# Patient Record
(demographics unavailable — no encounter records)

---

## 2024-12-03 NOTE — PLAN
[FreeTextEntry1] : -Continue Concerta to 45 m mg- Take at 7 am to minimize sleep side effects.  -Add 5 mg  methylphenidate in the afternoon for ADHD. -START sertraline as discussed:  Take 12.5 mg (1/2 tab) X 1 week, then increase to 25 mg X 1 week, then take 50 mg daily (2 tablets) -Counseling on ADHD medication; risks, benefits, possible side effects. - Discussed common side effects of SSRIs including GI upset, headache, sweating, appetite changes, sleep changes (nightmares, impaired sleep), behavioral activation, emergence suicidality, decreased libido, evolving psychopathology. Advised to contact provider immediately if child develops any rare but serious side effects including unusual changes in behavior or restlessness, or confusion.  Medication should not be stopped abruptly in most situations as can cause discontinuation syndrome (fever, hyperthermia, restlessness, confusion, et). -Recommended restart therapist for anxiety.  -Follow up in 1 month.

## 2024-12-03 NOTE — PHYSICAL EXAM
[Well-appearing] : well-appearing [Alert] : alert [Well related, good eye contact] : well related, good eye contact [Conversant] : conversant [Normal speech and language] : normal speech and language [Follows instructions well] : follows instructions well [Gross hearing intact] : gross hearing intact [de-identified] : Interactive and appropriate mood, friendly.

## 2024-12-03 NOTE — PHYSICAL EXAM
[Well-appearing] : well-appearing [Alert] : alert [Well related, good eye contact] : well related, good eye contact [Conversant] : conversant [Normal speech and language] : normal speech and language [Follows instructions well] : follows instructions well [Gross hearing intact] : gross hearing intact [de-identified] : Interactive and appropriate mood, friendly.

## 2024-12-03 NOTE — HISTORY OF PRESENT ILLNESS
[FreeTextEntry1] : Valeria is a 17 y/o female presents for an ADHD follow up s/p seen 24. Concerta 36 mg had been less effective than when initially started and seems to be causing delayed sleep.  As patient's main concern is to many her hyperactive symptoms, added guanfacine to help with ADHD and sleep.  Also increasde Concerta to 45 mg as patient concerned that non-stimulant may take weeks to be effective.  Strongly recommended resuming psychotherapy for anxiety disorder  She was diagnosed with ADHD, combined presentation on 24, and started on Concerta at that time. She has a complex medical history including ALL diagnosed in , s/p completed treatment 10/27/22, avascular necrosis of the right humeral head s/p surgery, iron overload, receiving monthly phlebotomy.  PLAN FROM PREVIOUS VISIT -Increase Concerta to 45 m mg- Take at 7 am to minimize sleep side effects.  -Start guanfacine ER  Week #1 Start guanfacine ER X 1 week at bedtime- STOPPED DUE TO ALLERGIC REACTION  Week #2 increase to 2 mg in the evening  Week #3 increase to3  mg in the evening  Week #3 increase to 4 mg  in the evening.  -Counseling on ADHD medication; risks, benefits, possible side effects. -Recommended restart therapist for anxiety.  -Follow up in 1 month.  INTERIM HPI GUANFACINE ALLERGY? 24- mother called; patient had symptoms of anaphylaxis after taking guanfacine.      Took methylphenidate at 2 pm.  Took guanfacine in the evening at 10 pm.  She felt her throat itchy an hour or 2 later.  Between 6:15 pm This morning her face was swollen, she c/o it was hard to breathe, mother call 911, was told her throat and tongue were swollen.  Was taken to [facility], was given EpiPen, and has since improved.   Took her Concerta 45 mg this morning in the ED, as was fine.  She has a past allergic reaction to Zofran.   Today, patient reports Concerta 45 mg works well but wears off around 1:30 or 2.  She takes it at 7 am.  She sometimes feels a bit jittery if she doesn't eat with it.  Her sleep is still a problem, although she doesn't feel it's related to the medication.   HPI FROM VISIT ON 24  Valeria is a 17 y/o female is here for an ADHD follow up s/p seen 24 and was doing well on Concerta 36 mg. She was diagnosed with ADHD, combined presentation on 24, and started on Concerta at that time. She has a complex medical history including ALL diagnosed in , s/p completed treatment 10/27/22, avascular necrosis of the right humeral head s/p surgery, iron overload, receiving monthly phlebotomy.   Her anxiety has slightly improved as well.   PLAN FROM PREVIOUS VISIT -Continue Concerta 36 mg- Take after breakfast due to nausea side effects.  -Counseling on ADHD medication; risks, benefits, possible side effects. -Sleep hygiene discussed.  -Follow up in 1 month.  INTERIM HPI Patient endorses it's not as effective as it used to be.  She is feeling morley "antsy" and asking if she can try a higher dose.  Wears off around 4:30- 5 pm.  Denies changes in appetite, headaches or stomachaches. Having problems with falling sleep since starting Concerta.  Since the summer, takes it at 8-9 am, and can't fall asleep until 1 am.  If she takes it at 10 am, is up until 4 am.  She often wakes up around by 9:30 am even when up late.  Does not feel tired during the day and has energy, but is aware she needs more sleep  Seems to last until around 4-5 pm.  Feels more "ansty throughout the day."  Has been feeling a bit more anxious, in certain situations, in public doesn't feel safe.  Started about a month ago, was triggered after walking her dog, though she was being followed.  Has not been seeing a therapist.    Mother was out of camera view, expressed concern about her sleep, agreeable to medication adjustment as needed.     HPI FROM VISIT ON 24  Valeria is a 17 y/o female is here for an ADHD follow up s/p diagnosed with ADHD, combined presentation on 24, and at last follow up i34 increased Concerta to 27 mg.  She has a complex medical history including ALL diagnosed in , s/p completed treatment 10/27/22, avascular necrosis of the right humeral head s/p surgery, iron overload, receiving monthly phlebotomy.  A recent neuropsychological evaluation was consistent with a diagnosis of ADHD, combined presentation, and generalized anxiety disorder.   Concerta 18 mg had some effectiveness, noticed by patient and her mother.  Her anxiety has slightly improved as well.  She has mild GI side effects (nausea) noticed to be worse when taken without food.    PLAN FROM PREVIOUS VISIT -Increase Concerta to 27 mg- Take after breakfast due to nausea side effects.  -Counseling on ADHD medication; risks, benefits, possible side effects. -Sleep hygiene discussed.  -Follow up in 1 month.  INTERIM HPI Patient not feeling well today, in bed, able to speak but did not want to be on video. Was taking 27 mg Concerta, was not very effective, then increased to 36 mg (took 2 X 18 mg) starting over a week ago and that has been very effective.  She feels much more focused and does not feel she needs a higher dosage. Her anxiety has improved, Patient denies medication side effects.  She has been taking it on the weekends and would like to take it all summer as well.    HPI FROM VISIT ON 24  Valeria is a 17 y/o female here for her 1st ADHD follow up s/p diagnosed with ADHD, combined presentation 24, and starting on Concerta. She has a complex medical history including ALL diagnosed in , s/p completed treatment 10/27/22, avascular necrosis of the right humeral head s/p surgery, iron overload, receiving monthly phlebotomy.  A recent neuropsychological evaluation was consistent with a diagnosis of ADHD, combined presentation, and generalized anxiety disorder.   Encouraged patient to try CBT therapy.    INTERIM HPI Taking Concerta 18 mg-has some effect, can focus a little better, but wears off by 4th period. She feels her anxiety is not worse at all, maybe even better.  Mother sees a difference as well.  Her anxiety seems better, she seems calmer.  Denies side effects except sometimes has some nausea, especially on the days she does not eat breakfast or takes before she eats. Sleep was delayed on the weekends when woke up later so took Concerta later.  Otherwise no sleep problems during the week.   HPI FROM VISIT ON 24  Valeria is a 17 y/o female here concerns of inattention difficulties and anxiety.  She has a complex medical history including  ALL diagnosed in , s/p completed treatment 10/27/22, avascular necrosis of the right humeral head s/p surgery, iron overload, receiving monthly phlebotomy.   She had expressed over the past year an increase in attention and hyperactive behaviors, and a neuropsychological evaluation in 2023 by Dr Catherine Vail, PhD. Medical records provided by mother; and scanned report in EHR: patient was diagnosed with the followin. Acquired cognitive dysfunction 2 ADHD, combined presentation 3. Generalized anxiety disorder.  Recommendations by Dr Vail include psychotherapy for anxiety, psychiatric consultation, and school accommodations such as extra time on testing, reduced assignments, brief breaks, and preferential seating.    Patient and mother are here today as they feel would like her to start ADHD medication. Patient lives with parents and 16 y/o sister and attends 11th grade at [employer].  Over the past year, Aggie expressed increasing difficulty concentrating in school and homework.  Her mother felt she was always very active, not able to sit still, however was not concerned as she does well academically.   As works is becoming harder, she is struggling more with focusing as she can't stop herself from moving.   Her anxiety has also worsened over the past year, and she gets particularly anxious about her phlebotomies every month.  Aggie was interviewed and throughout evaluation her legs were constantly swinging.  She feels her inattention difficulties are not due to her anxiety, and she is not interested in starting psychotherapy.  She expressed understanding that anxiety can cause inattention difficulties, however, feels it's her urge to constantly be in motion that she finds most impactful.  She wants to be able to sit still.  She cannot do many sports due to her arm surgery but enjoys running.  She has a 504 Plan since her medical treatments, and has extra time as needed on assignments. Valeria does her homework in school and feels the homework demands are low.      DEVELOPMENTAL HX  Child was born full term without complications and reached all age-appropriate developmental milestones without concerns. Early Intervention Services were not needed.   HOME BEHAVIORS (reported by mother)   -Attention: Mother reports patient is very easily distracted, needs frequent redirection, and has a lot of difficulty with multi-step instructions.   -Hyperactivity: Fidgety, not able to sit still.    -Impulsivity:  Is not aggressive, does interrupt when others are speaking, constantly blurts out.    -Organization: Has difficulty staying organized, and loses things all the time.   -Homework: Gets her homework done in 10 minutes, mostly done in school.      CURRENT SCHOOL -School: [employer], 11th grade  -Public school  -Special Ed services- 504 plan  -  Classification: Medical? - General education classroom -Accommodations- extra time -Academic performance/grades: 90's     RELATIONSHIPS: Gets along well with peers, parents.   EMOTIONAL Anxiety Denies depression   SLEEP Difficulty falling asleep- goes to bed at 12, wakes by 7:30.     ACTIVITIES/INTERESTS Has restrictions due to arm surgery Runs outdoors      MEDICAL HISTORY: Denies a history of tics Denies history of starting spells, twitching, seizure-like activity.   FAMILY HISTORY: Family history of ADHD:  grandfather Family history of anxiety or depression: Maternal side, depression, on meds Denies family history of cardiac conditions or early unexplained deaths (has had cardiology evaluation prior to chemo)

## 2024-12-03 NOTE — ASSESSMENT
[FreeTextEntry1] : Valeria is a 15 y/o female presents for an ADHD follow up s/p seen 07/16/24. She was started on guanfacine however causes symptoms of anaphylaxis.  Concerta was increased to 45 mg and this was well tolerated. Strongly recommended resuming psychotherapy for anxiety disorder  She was diagnosed with ADHD, combined presentation on 02/20/24, and started on Concerta at that time. She has a complex medical history including ALL diagnosed in 2020, s/p completed treatment 10/27/22, avascular necrosis of the right humeral head s/p surgery, iron overload, receiving monthly phlebotomy. Concerta 45 mg has been effective but wearing off early.   Will add PM dose of IR  methylphenidate 5 mg.  Will monitor sleep due to history of sleep difficulties.  Anxiety has worsened.   Patient/mother agreeable to starting SSRI.  Will start sertraline.

## 2024-12-03 NOTE — HISTORY OF PRESENT ILLNESS
[FreeTextEntry1] : Valeria is a 17 y/o female presents for an ADHD follow up s/p seen 24. Concerta 36 mg had been less effective than when initially started and seems to be causing delayed sleep.  As patient's main concern is to many her hyperactive symptoms, added guanfacine to help with ADHD and sleep.  Also increasde Concerta to 45 mg as patient concerned that non-stimulant may take weeks to be effective.  Strongly recommended resuming psychotherapy for anxiety disorder  She was diagnosed with ADHD, combined presentation on 24, and started on Concerta at that time. She has a complex medical history including ALL diagnosed in , s/p completed treatment 10/27/22, avascular necrosis of the right humeral head s/p surgery, iron overload, receiving monthly phlebotomy.  PLAN FROM PREVIOUS VISIT -Increase Concerta to 45 m mg- Take at 7 am to minimize sleep side effects.  -Start guanfacine ER  Week #1 Start guanfacine ER X 1 week at bedtime- STOPPED DUE TO ALLERGIC REACTION  Week #2 increase to 2 mg in the evening  Week #3 increase to3  mg in the evening  Week #3 increase to 4 mg  in the evening.  -Counseling on ADHD medication; risks, benefits, possible side effects. -Recommended restart therapist for anxiety.  -Follow up in 1 month.  INTERIM HPI GUANFACINE ALLERGY? 24- mother called; patient had symptoms of anaphylaxis after taking guanfacine.      Took methylphenidate at 2 pm.  Took guanfacine in the evening at 10 pm.  She felt her throat itchy an hour or 2 later.  Between 6:15 pm This morning her face was swollen, she c/o it was hard to breathe, mother call 911, was told her throat and tongue were swollen.  Was taken to [facility], was given EpiPen, and has since improved.   Took her Concerta 45 mg this morning in the ED, as was fine.  She has a past allergic reaction to Zofran.   Today, patient reports Concerta 45 mg works well but wears off around 1:30 or 2.  She takes it at 7 am.  She sometimes feels a bit jittery if she doesn't eat with it.  Her sleep is still a problem, although she doesn't feel it's related to the medication.   HPI FROM VISIT ON 24  Valeria is a 17 y/o female is here for an ADHD follow up s/p seen 24 and was doing well on Concerta 36 mg. She was diagnosed with ADHD, combined presentation on 24, and started on Concerta at that time. She has a complex medical history including ALL diagnosed in , s/p completed treatment 10/27/22, avascular necrosis of the right humeral head s/p surgery, iron overload, receiving monthly phlebotomy.   Her anxiety has slightly improved as well.   PLAN FROM PREVIOUS VISIT -Continue Concerta 36 mg- Take after breakfast due to nausea side effects.  -Counseling on ADHD medication; risks, benefits, possible side effects. -Sleep hygiene discussed.  -Follow up in 1 month.  INTERIM HPI Patient endorses it's not as effective as it used to be.  She is feeling morley "antsy" and asking if she can try a higher dose.  Wears off around 4:30- 5 pm.  Denies changes in appetite, headaches or stomachaches. Having problems with falling sleep since starting Concerta.  Since the summer, takes it at 8-9 am, and can't fall asleep until 1 am.  If she takes it at 10 am, is up until 4 am.  She often wakes up around by 9:30 am even when up late.  Does not feel tired during the day and has energy, but is aware she needs more sleep  Seems to last until around 4-5 pm.  Feels more "ansty throughout the day."  Has been feeling a bit more anxious, in certain situations, in public doesn't feel safe.  Started about a month ago, was triggered after walking her dog, though she was being followed.  Has not been seeing a therapist.    Mother was out of camera view, expressed concern about her sleep, agreeable to medication adjustment as needed.     HPI FROM VISIT ON 24  Valeria is a 17 y/o female is here for an ADHD follow up s/p diagnosed with ADHD, combined presentation on 24, and at last follow up i34 increased Concerta to 27 mg.  She has a complex medical history including ALL diagnosed in , s/p completed treatment 10/27/22, avascular necrosis of the right humeral head s/p surgery, iron overload, receiving monthly phlebotomy.  A recent neuropsychological evaluation was consistent with a diagnosis of ADHD, combined presentation, and generalized anxiety disorder.   Concerta 18 mg had some effectiveness, noticed by patient and her mother.  Her anxiety has slightly improved as well.  She has mild GI side effects (nausea) noticed to be worse when taken without food.    PLAN FROM PREVIOUS VISIT -Increase Concerta to 27 mg- Take after breakfast due to nausea side effects.  -Counseling on ADHD medication; risks, benefits, possible side effects. -Sleep hygiene discussed.  -Follow up in 1 month.  INTERIM HPI Patient not feeling well today, in bed, able to speak but did not want to be on video. Was taking 27 mg Concerta, was not very effective, then increased to 36 mg (took 2 X 18 mg) starting over a week ago and that has been very effective.  She feels much more focused and does not feel she needs a higher dosage. Her anxiety has improved, Patient denies medication side effects.  She has been taking it on the weekends and would like to take it all summer as well.    HPI FROM VISIT ON 24  Valeria is a 17 y/o female here for her 1st ADHD follow up s/p diagnosed with ADHD, combined presentation 24, and starting on Concerta. She has a complex medical history including ALL diagnosed in , s/p completed treatment 10/27/22, avascular necrosis of the right humeral head s/p surgery, iron overload, receiving monthly phlebotomy.  A recent neuropsychological evaluation was consistent with a diagnosis of ADHD, combined presentation, and generalized anxiety disorder.   Encouraged patient to try CBT therapy.    INTERIM HPI Taking Concerta 18 mg-has some effect, can focus a little better, but wears off by 4th period. She feels her anxiety is not worse at all, maybe even better.  Mother sees a difference as well.  Her anxiety seems better, she seems calmer.  Denies side effects except sometimes has some nausea, especially on the days she does not eat breakfast or takes before she eats. Sleep was delayed on the weekends when woke up later so took Concerta later.  Otherwise no sleep problems during the week.   HPI FROM VISIT ON 24  Valeria is a 17 y/o female here concerns of inattention difficulties and anxiety.  She has a complex medical history including  ALL diagnosed in , s/p completed treatment 10/27/22, avascular necrosis of the right humeral head s/p surgery, iron overload, receiving monthly phlebotomy.   She had expressed over the past year an increase in attention and hyperactive behaviors, and a neuropsychological evaluation in 2023 by Dr Catherine Vail, PhD. Medical records provided by mother; and scanned report in EHR: patient was diagnosed with the followin. Acquired cognitive dysfunction 2 ADHD, combined presentation 3. Generalized anxiety disorder.  Recommendations by Dr Vail include psychotherapy for anxiety, psychiatric consultation, and school accommodations such as extra time on testing, reduced assignments, brief breaks, and preferential seating.    Patient and mother are here today as they feel would like her to start ADHD medication. Patient lives with parents and 16 y/o sister and attends 11th grade at [employer].  Over the past year, Aggie expressed increasing difficulty concentrating in school and homework.  Her mother felt she was always very active, not able to sit still, however was not concerned as she does well academically.   As works is becoming harder, she is struggling more with focusing as she can't stop herself from moving.   Her anxiety has also worsened over the past year, and she gets particularly anxious about her phlebotomies every month.  Aggie was interviewed and throughout evaluation her legs were constantly swinging.  She feels her inattention difficulties are not due to her anxiety, and she is not interested in starting psychotherapy.  She expressed understanding that anxiety can cause inattention difficulties, however, feels it's her urge to constantly be in motion that she finds most impactful.  She wants to be able to sit still.  She cannot do many sports due to her arm surgery but enjoys running.  She has a 504 Plan since her medical treatments, and has extra time as needed on assignments. Valeria does her homework in school and feels the homework demands are low.      DEVELOPMENTAL HX  Child was born full term without complications and reached all age-appropriate developmental milestones without concerns. Early Intervention Services were not needed.   HOME BEHAVIORS (reported by mother)   -Attention: Mother reports patient is very easily distracted, needs frequent redirection, and has a lot of difficulty with multi-step instructions.   -Hyperactivity: Fidgety, not able to sit still.    -Impulsivity:  Is not aggressive, does interrupt when others are speaking, constantly blurts out.    -Organization: Has difficulty staying organized, and loses things all the time.   -Homework: Gets her homework done in 10 minutes, mostly done in school.      CURRENT SCHOOL -School: [employer], 11th grade  -Public school  -Special Ed services- 504 plan  -  Classification: Medical? - General education classroom -Accommodations- extra time -Academic performance/grades: 90's     RELATIONSHIPS: Gets along well with peers, parents.   EMOTIONAL Anxiety Denies depression   SLEEP Difficulty falling asleep- goes to bed at 12, wakes by 7:30.     ACTIVITIES/INTERESTS Has restrictions due to arm surgery Runs outdoors      MEDICAL HISTORY: Denies a history of tics Denies history of starting spells, twitching, seizure-like activity.   FAMILY HISTORY: Family history of ADHD:  grandfather Family history of anxiety or depression: Maternal side, depression, on meds Denies family history of cardiac conditions or early unexplained deaths (has had cardiology evaluation prior to chemo)

## 2025-01-21 NOTE — REVIEW OF SYSTEMS
[Anxiety] : anxiety [Negative] : Allergic/Immunologic [Fever] : no fever [Chills] : no chills [Sweating] : no sweating [Fatigue] : no fatigue [Weakness] : no weakness [Weight Change] : no weight change [Icterus] : no icterus [Abdominal Pain] : no abdominal pain [Nausea] : no nausea [Constipation] : no constipation [Rectal Pain] : no rectal pain [Hematochezia] : no hematochezia [Joint Pain] : no joint pain [Joint Swelling] : no joint swelling [Joint Stiffness] : no joint stiffness [Myalgia] : no myalgia [Back Pain] : no back pain [Bone Pain] : no bone pain [Resendez] : not resendez [Irritable] : not irritable [FreeTextEntry2] : had some attention difficulties at school last year, now on medication for ADHD  [FreeTextEntry1] : see HPI [de-identified] : right shoulder pain,  elbow AVN pain bilaterally right hurting more than left,   had  left elbow surgery but does not feel it helped her ability to move the joint [de-identified] : hx DM steroid induced

## 2025-01-21 NOTE — REASON FOR VISIT
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

## 2025-01-21 NOTE — CONSULT LETTER
[Dear  ___] : Dear  [unfilled], [Courtesy Letter:] : I had the pleasure of seeing your patient, [unfilled], in my office today. [Please see my note below.] : Please see my note below. [Consult Closing:] : Thank you very much for allowing me to participate in the care of this patient.  If you have any questions, please do not hesitate to contact me. [FreeTextEntry2] : Dr. Remedios Lundberg [address] [place] [address] phone [phone] [FreeTextEntry3] : KHANH Nielsen Pediatric Nurse Practitioner [facility] Pediatric Hematology/Oncology and Stem Cell Transplantation [id number] [facility], [address] [place] [address] Phone [phone] fax: [phone]

## 2025-01-21 NOTE — PHYSICAL EXAM
[Normal] : PERRL, extraocular movements intact, cranial nerves II-XII grossly intact [Gait abnormal] : gait abnormal [PERRLA] : GHAZAL [90: Minor restrictions in physically strenuous activity.] : 90: Minor restrictions in physically strenuous activity. [Pallor] : no pallor [Icterus] : not icterus [Mucositis] : no mucositis [Braces] : no braces  [de-identified] : capillary refill < 2 seconds [FreeTextEntry1] : defer [de-identified] : defer [de-identified] : both elbows with some pain with movement, cannot fully extend both arms , no swelling, FROM  [de-identified] : equal strength and tone to bilateral lower extremeties, sensation intact, normal baseline gait [de-identified] : interactive

## 2025-01-21 NOTE — HISTORY OF PRESENT ILLNESS
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  6/20-7/10/20: 13 yr old girl admitted to [facility] for 1-2 week history of flank pain, loss of appetite, fever and lymphadenopathy of the neck. seen by PMD who did cbc and notified parents to come to ER for abnormal labs. CBC in ER showed WBC=34.99, ANC 1,850 with 59% blast, HBG=11.3 and PLT 15,000.LDH 1396. Peripheral flow sent showed Lymphoblasts (82% of cells), positive for cytoplasmic CD3, TdT, CD38, CD34, CD7, CD2,minimal CD4, CD5, minimal , partial CD71, consistent with T-lymphoblastic leukemia/lymphoma. Bone marrow done on 6/23/20: FISH Result: NEGATIVE FOR BCR/ABL1 REARRANGEMENT, POSITIVE FOR KMT2A (MLL) REARRANGEMENT IN 82.0% Of CELLS Karyotype:46,XX,del(7)(p?13),t(11;19)(q23;p13.3)        {6              }/46,XX,del(9)(p22),t(11;19)(q23;p13.3)          {3             }/46,XX             {11            }. LP showed CNS 2b. Echo done 6/22/20 with SF 32%. IR placed mediport on 6/23/20. Rasburicase given x 4 doses total and started on allopurinol. patient was started following protocol [id number] induction. CNS clear after 3 consecutive LP with either IT MTX or IT cytarabine. complications during induction included an allergic reaction to ondansetron. She also developed steroid induced hyperglycemia on 6/27/20 and was started on lantus and formulated meal correction with calculated humalog doses based on correction factors and insulin to carb ratio and target insulin. Discharged on 7/10/20.  8/6-8/8/20: Admitted for Fever and chills, concern for port site infection and/or ingrown toe nail infection, s/p ceftriaxone and vancomycin, cultures negative.   8/25-8/27: admitted for r/o sepsis and observation for chills and left lower quadrant abdominal pain. Cultures were negative. All symptoms improved. She remained afebrile.  2/19-3/5/21: admitted for febrile neutropenia sent to PICU for hypotension requiring pressors. Blood cultures were negative but patient was treated for culture negative sepsis. received neupogen. SEnt home on Levofloxacin 3/16/21: begin maintenance 3/30/21: oral mercaptopurine and MTX held for PLT count 26, 000, Direct bilirubin 3.3, total bili 4.8. 4/2/21: direct bilirubin level 6.5, total bilirubin 8.4, US abdomen done: Liver is upper limits of normal with respect to size. No masses are seen. Normal hepatic vasculature.. Gallbladder sludge.Started on po ursodiol. 6/6/21: T2 star MRI done to assess iron overload since patient was noted to have elevated ferritin level of 3183 on 4/20/21. The MRI showed hepatic iron overload, started on Desferal SQ treatment at home.  3/21-3/23/22: admission for non neutropenic fever, chills, blood cultures negative, RVP positive for coronavirus OC 43.  5/15/22: MRI right shoulder shows AVN of the right humeral head with surrounding bone marrow edema, referred to ortho.  10/27/22: end treatment  . 11/11/22: MRI of abdomen done for iron evaluation shows CARDIAC: There is no significant cardiac iron deposition.  LIVER: positive for Hepatic iron deposition   T-Cell ALL  MLL rearrangement End of Induction marrow: 1.5% positive End of consolidation marrow: negative.  10/27/22: end of treatment 12/27/23: begin monthly phlebotomy for iron overload 12/28/22: mediport removed 2/13/23: end of treatment echo done, SF 32%   [de-identified] : Valeria is a 17 yr old female with HR T-Cell ALL who completed treatment per protocol [id number] on 10/27/22. She is here today for bloodwork  and a check up for her 2 1/4 years off treatment follow up. She has been receiving monthly phlebotomy for her known iron overload, we were having difficulty getting venous access but for the last few months we have been successful by using the sonogram guided IV insertion equipment.   According to Valeria and her mother, she has been doing fairly well since her last visit. She was seen in the ER at the beginning of the month for persistent fevers and was found to have adenovirus. Her symptoms are now resolved. She is still having mobility issues with her elbows and continues her follow up with ortho. She continues to follow up with neuro for medical management of her ADHD. No other complaints or problems. She is in the 12th grade and doing well.     She is trying to do better taking her vitamin D pills, her recent level was still low.  She is also taking her ADHD medication as prescribed.

## 2025-01-23 NOTE — HISTORY OF PRESENT ILLNESS
[Home] : at home, [unfilled] , at the time of the visit. [Medical Office: ([facility])___] : at the medical office located in  [Mother] : mother [FreeTextEntry3] : mother [FreeTextEntry1] : Valeria is a 15 y/o female here for an ADHD follow up s/p seen 24 and starting on sertraline for anxiety..   She also takes Concerta 45 mg which was wearing off early thus added an afternoon dose of IR  methylphenidate.   Strongly recommended resuming psychotherapy for anxiety disorder. Previously has tried guanfacine however caused anaphylaxis. She was diagnosed with ADHD, combined presentation on 24, and started on Concerta at that time. She has a complex medical history including ALL diagnosed in , s/p completed treatment 10/27/22 (3. years ago), avascular necrosis of the right humeral head s/p surgery, iron overload, receiving monthly phlebotomy.  PLAN FROM PREVIOUS VISIT (24)  -Continue Concerta to 45 m mg- Take at 7 am to minimize sleep side effects.  -Add 5 mg  methylphenidate in the afternoon for ADHD. -START sertraline as discussed:  Take 12.5 mg (1/2 tab) X 1 week, then increase to 25 mg X 1 week, then take 50 mg daily (2 tablets) -Counseling on ADHD medication; risks, benefits, possible side effects. - Discussed common side effects of SSRIs including GI upset, headache, sweating, appetite changes, sleep changes (nightmares, impaired sleep), behavioral activation, emergence suicidality, decreased libido, evolving psychopathology. Advised to contact provider immediately if child develops any rare but serious side effects including unusual changes in behavior or restlessness, or confusion.  Medication should not be stopped abruptly in most situations as can cause discontinuation syndrome (fever, hyperthermia, restlessness, confusion, et). -Recommended restart therapist for anxiety.  -Follow up in 1 month.   INTERIM HPI Feeling her mood is a lot better.  No longer worrying all the time, anxiety is very low and manageable. Mother sees a big improvement as well.   Mother/patient both feel this dosage is very good.  Concerta 45 mg still very effective.  She has not tried PM dose as hasn't felt she needed it.   HPI FROM VISIT ON 24  Valeria is a 15 y/o female presents for an ADHD follow up s/p seen 24. Concerta 36 mg had been less effective than when initially started and seems to be causing delayed sleep.  As patient's main concern is to many her hyperactive symptoms, added guanfacine to help with ADHD and sleep.  Also increasde Concerta to 45 mg as patient concerned that non-stimulant may take weeks to be effective.  Strongly recommended resuming psychotherapy for anxiety disorder  She was diagnosed with ADHD, combined presentation on 24, and started on Concerta at that time. She has a complex medical history including ALL diagnosed in , s/p completed treatment 10/27/22, avascular necrosis of the right humeral head s/p surgery, iron overload, receiving monthly phlebotomy.  PLAN FROM PREVIOUS VISIT -Increase Concerta to 45 m mg- Take at 7 am to minimize sleep side effects.  -Start guanfacine ER  Week #1 Start guanfacine ER X 1 week at bedtime- STOPPED DUE TO ALLERGIC REACTION  Week #2 increase to 2 mg in the evening  Week #3 increase to3  mg in the evening  Week #3 increase to 4 mg  in the evening.  -Counseling on ADHD medication; risks, benefits, possible side effects. -Recommended restart therapist for anxiety.  -Follow up in 1 month.  INTERIM HPI GUANFACINE ALLERGY? 24- mother called; patient had symptoms of anaphylaxis after taking guanfacine.      Took methylphenidate at 2 pm.  Took guanfacine in the evening at 10 pm.  She felt her throat itchy an hour or 2 later.  Between 6:15 pm This morning her face was swollen, she c/o it was hard to breathe, mother call 911, was told her throat and tongue were swollen.  Was taken to [facility], was given EpiPen, and has since improved.   Took her Concerta 45 mg this morning in the ED, as was fine.  She has a past allergic reaction to Zofran.   Today, patient reports Concerta 45 mg works well but wears off around 1:30 or 2.  She takes it at 7 am.  She sometimes feels a bit jittery if she doesn't eat with it.  Her sleep is still a problem, although she doesn't feel it's related to the medication.   HPI FROM VISIT ON 24  Valeria is a 15 y/o female is here for an ADHD follow up s/p seen 24 and was doing well on Concerta 36 mg. She was diagnosed with ADHD, combined presentation on 24, and started on Concerta at that time. She has a complex medical history including ALL diagnosed in , s/p completed treatment 10/27/22, avascular necrosis of the right humeral head s/p surgery, iron overload, receiving monthly phlebotomy.   Her anxiety has slightly improved as well.   PLAN FROM PREVIOUS VISIT -Continue Concerta 36 mg- Take after breakfast due to nausea side effects.  -Counseling on ADHD medication; risks, benefits, possible side effects. -Sleep hygiene discussed.  -Follow up in 1 month.  INTERIM HPI Patient endorses it's not as effective as it used to be.  She is feeling morley "antsy" and asking if she can try a higher dose.  Wears off around 4:30- 5 pm.  Denies changes in appetite, headaches or stomachaches. Having problems with falling sleep since starting Concerta.  Since the summer, takes it at 8-9 am, and can't fall asleep until 1 am.  If she takes it at 10 am, is up until 4 am.  She often wakes up around by 9:30 am even when up late.  Does not feel tired during the day and has energy, but is aware she needs more sleep  Seems to last until around 4-5 pm.  Feels more "ansty throughout the day."  Has been feeling a bit more anxious, in certain situations, in public doesn't feel safe.  Started about a month ago, was triggered after walking her dog, though she was being followed.  Has not been seeing a therapist.    Mother was out of camera view, expressed concern about her sleep, agreeable to medication adjustment as needed.     HPI FROM VISIT ON 24  Valeria is a 15 y/o female is here for an ADHD follow up s/p diagnosed with ADHD, combined presentation on 24, and at last follow up i34 increased Concerta to 27 mg.  She has a complex medical history including ALL diagnosed in , s/p completed treatment 10/27/22, avascular necrosis of the right humeral head s/p surgery, iron overload, receiving monthly phlebotomy.  A recent neuropsychological evaluation was consistent with a diagnosis of ADHD, combined presentation, and generalized anxiety disorder.   Concerta 18 mg had some effectiveness, noticed by patient and her mother.  Her anxiety has slightly improved as well.  She has mild GI side effects (nausea) noticed to be worse when taken without food.    PLAN FROM PREVIOUS VISIT -Increase Concerta to 27 mg- Take after breakfast due to nausea side effects.  -Counseling on ADHD medication; risks, benefits, possible side effects. -Sleep hygiene discussed.  -Follow up in 1 month.  INTERIM HPI Patient not feeling well today, in bed, able to speak but did not want to be on video. Was taking 27 mg Concerta, was not very effective, then increased to 36 mg (took 2 X 18 mg) starting over a week ago and that has been very effective.  She feels much more focused and does not feel she needs a higher dosage. Her anxiety has improved, Patient denies medication side effects.  She has been taking it on the weekends and would like to take it all summer as well.    HPI FROM VISIT ON 24  Valeria is a 15 y/o female here for her 1st ADHD follow up s/p diagnosed with ADHD, combined presentation 24, and starting on Concerta. She has a complex medical history including ALL diagnosed in , s/p completed treatment 10/27/22, avascular necrosis of the right humeral head s/p surgery, iron overload, receiving monthly phlebotomy.  A recent neuropsychological evaluation was consistent with a diagnosis of ADHD, combined presentation, and generalized anxiety disorder.   Encouraged patient to try CBT therapy.    INTERIM HPI Taking Concerta 18 mg-has some effect, can focus a little better, but wears off by 4th period. She feels her anxiety is not worse at all, maybe even better.  Mother sees a difference as well.  Her anxiety seems better, she seems calmer.  Denies side effects except sometimes has some nausea, especially on the days she does not eat breakfast or takes before she eats. Sleep was delayed on the weekends when woke up later so took Concerta later.  Otherwise no sleep problems during the week.   HPI FROM VISIT ON 24  Valeria is a 15 y/o female here concerns of inattention difficulties and anxiety.  She has a complex medical history including  ALL diagnosed in , s/p completed treatment 10/27/22, avascular necrosis of the right humeral head s/p surgery, iron overload, receiving monthly phlebotomy.   She had expressed over the past year an increase in attention and hyperactive behaviors, and a neuropsychological evaluation in 2023 by Dr Catherine Vail, PhD. Medical records provided by mother; and scanned report in EHR: patient was diagnosed with the followin. Acquired cognitive dysfunction 2 ADHD, combined presentation 3. Generalized anxiety disorder.  Recommendations by Dr Vail include psychotherapy for anxiety, psychiatric consultation, and school accommodations such as extra time on testing, reduced assignments, brief breaks, and preferential seating.    Patient and mother are here today as they feel would like her to start ADHD medication. Patient lives with parents and 16 y/o sister and attends 11th grade at [employer].  Over the past year, Aggie expressed increasing difficulty concentrating in school and homework.  Her mother felt she was always very active, not able to sit still, however was not concerned as she does well academically.   As works is becoming harder, she is struggling more with focusing as she can't stop herself from moving.   Her anxiety has also worsened over the past year, and she gets particularly anxious about her phlebotomies every month.  Aggie was interviewed and throughout evaluation her legs were constantly swinging.  She feels her inattention difficulties are not due to her anxiety, and she is not interested in starting psychotherapy.  She expressed understanding that anxiety can cause inattention difficulties, however, feels it's her urge to constantly be in motion that she finds most impactful.  She wants to be able to sit still.  She cannot do many sports due to her arm surgery but enjoys running.  She has a 504 Plan since her medical treatments, and has extra time as needed on assignments. Valeria does her homework in school and feels the homework demands are low.      DEVELOPMENTAL HX  Child was born full term without complications and reached all age-appropriate developmental milestones without concerns. Early Intervention Services were not needed.   HOME BEHAVIORS (reported by mother)   -Attention: Mother reports patient is very easily distracted, needs frequent redirection, and has a lot of difficulty with multi-step instructions.   -Hyperactivity: Fidgety, not able to sit still.    -Impulsivity:  Is not aggressive, does interrupt when others are speaking, constantly blurts out.    -Organization: Has difficulty staying organized, and loses things all the time.   -Homework: Gets her homework done in 10 minutes, mostly done in school.      CURRENT SCHOOL -School: [employer], 11th grade  -Public school  -Special Ed services- 504 plan  -  Classification: Medical? - General education classroom -Accommodations- extra time -Academic performance/grades: 90's     RELATIONSHIPS: Gets along well with peers, parents.   EMOTIONAL Anxiety Denies depression   SLEEP Difficulty falling asleep- goes to bed at 12, wakes by 7:30.     ACTIVITIES/INTERESTS Has restrictions due to arm surgery Runs outdoors      MEDICAL HISTORY: Denies a history of tics Denies history of starting spells, twitching, seizure-like activity.   FAMILY HISTORY: Family history of ADHD:  grandfather Family history of anxiety or depression: Maternal side, depression, on meds Denies family history of cardiac conditions or early unexplained deaths (has had cardiology evaluation prior to chemo)

## 2025-01-23 NOTE — ASSESSMENT
[FreeTextEntry1] : Valeria is a 15 y/o female here for an ADHD follow up s/p seen 12/12/24 and starting on sertraline for anxiety..   She also takes Concerta 45 mg which was wearing off early thus added an afternoon dose of IR  methylphenidate.   Strongly recommended resuming psychotherapy for anxiety disorder. Previously has tried guanfacine however caused anaphylaxis. She was diagnosed with ADHD, combined presentation on 02/20/24, and started on Concerta at that time. She has a complex medical history including ALL diagnosed in 2020, s/p completed treatment 10/27/22, avascular necrosis of the right humeral head s/p surgery, iron overload, receiving monthly phlebotomy. Sertraline 50 mg has been very effective and well tolerated. Will maintain this dosage. Discussed may make adjustments if needed if anxiety worsens, however best to take for 1 year before considering stopping.  Concerta 45 mg continues to be well tolerated and effective as well for ADHD>

## 2025-01-23 NOTE — PLAN
[FreeTextEntry1] : -Continue Concerta to 45 m mg- Take at 7 am to minimize sleep side effects.  -Continue 5 mg  methylphenidate in the afternoon for ADHD  (has not needed) -Sertraline: 50 mg once daily in the morning  -Counseling on ADHD medication; risks, benefits, possible side effects. - Discussed common side effects of SSRIs including GI upset, headache, sweating, appetite changes, sleep changes (nightmares, impaired sleep), behavioral activation, emergence suicidality, decreased libido, evolving psychopathology.  Medication should not be stopped abruptly in most situations as can cause discontinuation syndrome (fever, hyperthermia, restlessness, confusion, et). -Follow up in 3 month.

## 2025-01-23 NOTE — PHYSICAL EXAM
[Well-appearing] : well-appearing [Alert] : alert [Well related, good eye contact] : well related, good eye contact [Conversant] : conversant [Normal speech and language] : normal speech and language [Gross hearing intact] : gross hearing intact [de-identified] : Interactive and appropriate mood, friendly.

## 2025-04-25 NOTE — CONSULT LETTER
[Dear  ___] : Dear  [unfilled], [Courtesy Letter:] : I had the pleasure of seeing your patient, [unfilled], in my office today. [Please see my note below.] : Please see my note below. [Consult Closing:] : Thank you very much for allowing me to participate in the care of this patient.  If you have any questions, please do not hesitate to contact me. [FreeTextEntry2] : Dr. Remedios Lundberg [address] [place] [address] phone [phone] [FreeTextEntry3] : KHANH Nielsen Pediatric Nurse Practitioner [facility] Pediatric Hematology/Oncology and Stem Cell Transplantation [id number] [facility], [address] [place] [address] Phone [phone] fax: [phone]

## 2025-04-25 NOTE — HISTORY OF PRESENT ILLNESS
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  6/20-7/10/20: 13 yr old [place] [facility] [place]2 [place] history of flank pain, loss of appetite, fever and lymphadenopathy of the neck. seen by PMD who did cbc and notified parents to come to ER for abnormal labs. CBC in ER showed WBC=34.99, ANC 1,850 with 59% blast, HBG=11.3 and PLT 15,000.LDH 1396. Peripheral flow sent showed Lymphoblasts (82% of cells), positive for cytoplasmic CD3, TdT, CD38, CD34, CD7, CD2,minimal CD4, CD5, minimal , partial CD71, consistent with T-lymphoblastic leukemia/lymphoma. Bone marrow done on 6/23/20: FISH Result: NEGATIVE FOR BCR/ABL1 REARRANGEMENT, POSITIVE FOR KMT2A (MLL) REARRANGEMENT IN 82.0% Of CELLS Karyotype:46,XX,del(7)(p?13),t(11;19)(q23;p13.3)         {6                }/46,XX,del(9)(p22),t(11;19)(q23;p13.3)           {3               }/46,XX              {11              }. LP showed CNS 2b. Echo done 6/22/20 with SF 32%. IR placed mediport on 6/23/20. Rasburicase given x 4 doses total and started on allopurinol. patient was started following protocol [id number] induction. CNS clear after 3 consecutive LP with either IT MTX or IT cytarabine. complications during induction included an allergic reaction to ondansetron. She also developed steroid induced hyperglycemia on 6/27/20 and was started on lantus and formulated meal correction with calculated humalog doses based on correction factors and insulin to carb ratio and target insulin. Discharged on 7/10/20.  8/6-8/8/20: Admitted for Fever and chills, concern for port site infection and/or ingrown toe nail infection, s/p ceftriaxone and vancomycin, cultures negative.   8/25-8/27: admitted for r/o sepsis and observation for chills and left lower quadrant abdominal pain. Cultures were negative. All symptoms improved. She remained afebrile.  2/19-3/5/21: admitted for febrile neutropenia sent to PICU for hypotension requiring pressors. Blood cultures were negative but patient was treated for culture negative sepsis. received neupogen. SEnt home on Levofloxacin 3/16/21: begin maintenance 3/30/21: oral mercaptopurine and MTX held for PLT count 26, 000, Direct bilirubin 3.3, total bili 4.8. 4/2/21: direct bilirubin level 6.5, total bilirubin 8.4, US abdomen done: Liver is upper limits of normal with respect to size. No masses are seen. Normal hepatic vasculature.. Gallbladder sludge.Started on po ursodiol. 6/6/21: T2 star MRI done to assess iron overload since patient was noted to have elevated ferritin level of 3183 on 4/20/21. The MRI showed hepatic iron overload, started on Desferal SQ treatment at home.  3/21-3/23/22: admission for non neutropenic fever, chills, blood cultures negative, RVP positive for coronavirus OC 43.  5/15/22: MRI right shoulder shows AVN of the right humeral head with surrounding bone marrow edema, referred to ortho.  10/27/22: end treatment  . 11/11/22: MRI of abdomen done for iron evaluation shows CARDIAC: There is no significant cardiac iron deposition.  LIVER: positive for Hepatic iron deposition   T-Cell ALL  MLL rearrangement End of Induction marrow: 1.5% positive End of consolidation marrow: negative.  10/27/22: end of treatment 12/27/23: begin monthly phlebotomy for iron overload 12/28/22: mediport removed 2/13/23: end of treatment echo done, SF 32%   [de-identified] : Valeria is a 17 yr old female with HR T-Cell ALL who completed treatment per protocol [id number] on 10/27/22. She is here today for bloodwork  and a check up for her 2 1/2 years off treatment follow up. She has been receiving monthly phlebotomy for her known iron overload, we were having difficulty getting venous access but for the last few months we have been successful by using the sonogram guided IV insertion equipment.   According to Valeria and her mother, she has been doing well since her last visit. She is still having mobility issues with her elbows and continues her follow up with ortho. She continues to follow up with neuro for medical management of her ADHD. No other complaints or problems. She is in the 12th grade and doing well.     She is trying to do better taking her vitamin D pills as often as possible.  She is also taking her ADHD medication as prescribed.

## 2025-04-25 NOTE — REVIEW OF SYSTEMS
[Anxiety] : anxiety [Negative] : Allergic/Immunologic [Fever] : no fever [Chills] : no chills [Sweating] : no sweating [Fatigue] : no fatigue [Weakness] : no weakness [Weight Change] : no weight change [Icterus] : no icterus [Abdominal Pain] : no abdominal pain [Nausea] : no nausea [Constipation] : no constipation [Rectal Pain] : no rectal pain [Hematochezia] : no hematochezia [Joint Pain] : no joint pain [Joint Swelling] : no joint swelling [Joint Stiffness] : no joint stiffness [Myalgia] : no myalgia [Back Pain] : no back pain [Bone Pain] : no bone pain [Resendez] : not resendez [Irritable] : not irritable [FreeTextEntry2] : had some attention difficulties at school last year, now on medication for ADHD which is helping  [FreeTextEntry1] : see HPI [de-identified] : right shoulder pain,  elbow AVN pain bilaterally right hurting more than left,   had  left elbow surgery but does not feel it helped her ability to move the joint [de-identified] : hx DM steroid induced

## 2025-04-25 NOTE — PHYSICAL EXAM
[Normal] : PERRL, extraocular movements intact, cranial nerves II-XII grossly intact [Gait abnormal] : gait abnormal [PERRLA] : GHAZAL [90: Minor restrictions in physically strenuous activity.] : 90: Minor restrictions in physically strenuous activity. [Pallor] : no pallor [Icterus] : not icterus [Mucositis] : no mucositis [Braces] : no braces  [de-identified] : capillary refill < 2 seconds [FreeTextEntry1] : defer [de-identified] : defer [de-identified] : both elbows with some pain with movement, cannot fully extend both arms , no swelling [de-identified] : equal strength and tone to bilateral lower extremeties, sensation intact, normal baseline gait [de-identified] : interactive

## 2025-06-23 NOTE — ASSESSMENT
[FreeTextEntry1] : This young lady comes today accompanied by her mother regarding the chief complaint of bilateral elbow pain.  Today's visit lasted for approximately 30 minutes with time spent discussing etiologies for bilateral elbow pain and possible pain management strategies.  This is time independent of education, teaching as well as other reported services.   INTERVAL HISTORY:  Valeria comes today accompanied by her mother.  The patient reports that she has made some improvement in her left elbow, but for the most part, still remains limited, particularly with full extension.  Her left elbow has more or less become mobile to the point where she has a similar range of motion involving both of her elbows; however, her primary complaint today is not necessarily related to limited elbow range of motion.  It appears to be most consistent with bilateral elbow pain which involves the global elbow with no significant radiation.  This has made life quite difficult for Valeria.  She has been unable to become active.  She has been unable to perform activities of daily living and has difficulty even doing her hair.  Even though her range of motion is improved on the left, her pain prevents her from doing so.  The patient has not been evaluated in quite some time.  She does report some mechanical symptoms, occasional locking and catching of the elbows.  In addition, her mother has also reported the fact that there have been some issues from the hematology service indicating the fact that she has higher levels of iron and that this may be related to possible inflammation raising questions as to whether or not her elbow inflammation is causing the above findings.   PAST MEDICAL HISTORY:  Since the date of the last evaluation, past medical history has changed as noted above.   SOCIAL HISTORY:  Unchanged.   PHYSICAL EXAMINATION: On examination today, Valeria is in no apparent distress.  She is pleasant and cooperative.  Focused examination of her bilateral elbows demonstrates no clinical deformity.  There does not appear to be any significant swelling.  The patient is globally tender to palpation throughout her exam, although there does not appear to be an appreciable elbow joint effusion.  The patient can come within 30 degrees from full extension bilaterally.  Valeria's medial surgical incision appears to be well healed on the left.  She does not have any palpable crepitus when going from terminal extension to 30 degrees to terminal flexion, which appears to be full, which allows her to reach her mouth as well as her hair.  The patient has no limitation in pronation and supination, although this does recreate pain and discomfort.  She has 5/5 EPL, ADC, first dorsal interosseous and FDP.  Index finger with capillary refill less than 2 seconds.   Repeat x-ray imaging of bilateral elbows, AP, lateral, and oblique views indicate evidence of osseous abnormalities and suggestion on the right side of a possible intraarticular loose body.  There does appear to be some dysmorphic features as well as avascular changes of the capitellum bilaterally as well as a suggestion of a small elbow joint effusion, although perfect lateral imaging was not obtained.  There does not appear to be an appreciable anterior sail sign.   ASSESSMENT/PLAN: Valeria is a 17-year-old young lady who has had a complicated history complaining of bilateral elbow pain consistent with avascular necrosis versus osteochondritis dissecans lesion from her underlying treatment for her cancer.  The patient also has iron overload.  I did review with the patient's mother that based on the type of swelling that would cause that, I do not feel that the elbows are responsible as this would be more of a systemic process.  I made recommendations to obtain both CT and MRI scans of her bilateral elbows given the fact that I suspect that some impingement and inability to come to full extension is precipitating some discomfort as well as articular incongruity which is both chondral as well as osseous may be underlying the issue.  This would also evaluate for intraarticular loose bodies as well as need for further surgical management involving both elbows.  I did review the fact that Synvisc and other types of hyaluronic acid supplementation is not indicated for upper extremities and that corticosteroid injections may be indicated to help with pain and discomfort, although this would only temporize the symptoms and would have to be repeated as the underlying process has not resolved.  We will obtain insurance authorization for bilateral CT and elbow MRI scans and then the mother will contact me after completion so that we can discuss the intended treatment plan moving forward which will be a complicated issue given the fact that she has not responded to physical therapy services in the past.  Valeria and her mother who acted as an independent historian today given the child's pediatric age expressed understanding and agrees.